# Patient Record
Sex: FEMALE | Race: BLACK OR AFRICAN AMERICAN
[De-identification: names, ages, dates, MRNs, and addresses within clinical notes are randomized per-mention and may not be internally consistent; named-entity substitution may affect disease eponyms.]

---

## 2018-06-15 ENCOUNTER — HOSPITAL ENCOUNTER (OUTPATIENT)
Dept: HOSPITAL 96 - M.RTH | Age: 63
End: 2018-06-15
Attending: RADIOLOGY
Payer: COMMERCIAL

## 2018-06-15 DIAGNOSIS — Z51.0: Primary | ICD-10-CM

## 2018-06-15 DIAGNOSIS — C02.9: ICD-10-CM

## 2018-06-24 NOTE — CON
[address]  [address]                    CONSULTATION                  
_______________________________________________________________________________
 
Name:       LUZ TORRES                  Room:                      REG CLI 
M.R.#:  [id number]      Account #:      [id number]  
Admission:  06/15/18     Attend Phys:    Collins Tapia MD    
Discharge:               Date of Birth:  [birth year]  
         Report #: [id number]
                                                                     [id number]  
_______________________________________________________________________________
THIS REPORT FOR:  //name//                      
 
CC: Collins Keith MD
 
DATE OF CONSULTATION:  06/15/2018
 
[facility] Phone: [phone]
 
 
RADIATION ONCOLOGY CONSULT NOTE
 
REFERRING PHYSICIANS:  Rivera Keith MD as well as Roberth Townsend III, DO
 
PRIMARY SITE AND HISTOPATHOLOGY:  The patient underwent resection of an oral
tongue cancer which ended up being a T3 N0 M0 oral tongue cancer with
perineural invasion.
 
HISTORY OF PRESENT ILLNESS:  The patient is a 62-year-old woman who developed a
right-sided tongue lesion that was noted in 2017.  The patient was
given several rounds of antibiotics and the lesion did not resolve.  The
patient then had a biopsy of this lesion on 2018 of the right lateral
tongue and the pathology revealed an invasive moderately differentiated
squamous cell carcinoma.  The patient also then went on to undergo a CT scan of
the neck that was done on 2018, which revealed a tongue mass that
measured about 4.6 cm.  On 2018, the patient underwent a right
hemiglossectomy, limited pharyngectomy, right modified radical neck dissection,
tracheostomy, left radial forearm free flap, pharyngoplasty, and complex
vestibuloplasty with the skin graft to arm and that was done by Dr. Keith. 
The pathology revealed a moderately differentiated squamous cell carcinoma. 
The greatest dimension was 3.67 cm.  The total dimensions were 3.6 cm. x 2.8
cm.  x 1.8 cm.  There was tumor extension into skeletal muscle.  The margins
were not involved by invasive cancer. The closest margin was about 1 mm from
the surgical margin.  Twenty four lymph nodes were all resected.  None of the
lymph nodes were involved with cancer.  The patient had perineural invasion. 
The patient had a PET/CT scan on 2018, which revealed a hypermetabolic
right tongue lesion consistent with a primary malignancy of the right tonsillar
pillar.  The patient was then discussed at the multidisciplinary head and neck
cancer conference and they recommended postoperative radiation therapy.  The
patient presents to be considered for radiation therapy.  She is taking her
nutrition through her gastric tube that was placed on 2018.
 
PAST MEDICAL HISTORY AND PAST SURGICAL HISTORY:  The patient has a history of
depression and migraine headaches.
 
 
 
 
[place] [address]                    CONSULTATION                  
_______________________________________________________________________________
 
Name:       LUZ TORRES                  Room:                      REG CLI 
M.R.#:  [id number]      Account #:      [id number]  
Admission:  06/15/18     Attend Phys:    Collins Tapia MD    
Discharge:               Date of Birth:  [birth year]  
         Report #: [id number]
                                                                     [id number]  
_______________________________________________________________________________
MEDICATIONS:  Include 1 mg/mL oxycodone solution 5-10 mL every 6 hours as needed
for pain control as well as Mucomyst, Fosamax, vitamin D, lorazepam as needed,
Pamelor, and Senokot.
 
ALLERGIES:  FISH CONTAINING PRODUCTS, SHELLFISH AND SULFA.
 
OBSTETRICS AND GYNECOLOGY:  She is  3, para 3, menarche at age 11,
menopause at 29 years of age.
 
FAMILY HISTORY:  Mother had hypertension.
 
SOCIAL HISTORY:  The patient is retired.  She is .  She has 3 daughters. 
Ethanol:  She does not drink alcohol containing beverages.  Cigarettes:  She
quit smoking on 05/15/2018.  She smoked 1 pack per day for about 40 years.
 
REVIEW OF SYSTEMS:
GENERAL:  She denied having any fevers or chills.
SKIN:  She denied any color changes.
LYMPH NODES:  She denied having any enlarged or painful glands in the neck.
ENDOCRINE:  She denied having any hot or cold intolerance.
HEMATOLOGY/IMMUNOLOGY:  The patient denied having any recent bleeding.
MUSCULOSKELETAL:  She denied having any painful swollen joints.
HEAD AND NECK:  She denied having any headaches or migraines.
RESPIRATORY:  She denied having any shortness of breath.
CARDIOVASCULAR:  She denied having any palpitations.
GASTROINTESTINAL:  She denied having nausea or vomiting.
NEUROLOGIC:  She denied having any focal weakness.
 
PHYSICAL EXAMINATION:  VITAL SIGNS:  Height 5 feet 8 inches, weight 110.2
pounds, blood pressure 137/74, pulse 107, respirations 20, oxygen saturation
100%.  LYMPH NODES:  She had no cervical or supraclavicular lymphadenopathy. 
EYES:  Pupils were equal, round, reactive to light and accommodation.  HEAD,
EARS, NOSE AND THROAT:  Mouth:  She has a resected portion of the tongue on the
right side that appears to be healing well and she has a tracheostomy in place.
She does have her lower teeth intact.  HEART:  Had a regular rate and rhythm
without murmur.  LUNGS: were clear to auscultation.  ABDOMEN:  Had a gastric
tube intact.  Liver was at the costal margin.  Spleen was not palpable. 
NEUROLOGIC:  Cranial nerves II to XIIwere intact.  Sensation was intact.  She
had 5/5 strength in her extremities.
 
LABORATORY DATA:  From 2018, hemoglobin 9.2, platelets 182,000.  Sodium
138, potassium 3.7, creatinine 0.55.
 
ASSESSMENT AND PLAN:  The patient has a resected squamous cell carcinoma of the
tongue with high risk features consisting of perineural invasion.  The patient
was offered postoperative radiation therapy.  It is high risk based on studies
 
 
 
[place] [address]                    CONSULTATION                  
_______________________________________________________________________________
 
Name:       ULZ TORRES                  Room:                      REG CLI 
M.R.#:  [id number]      Account #:      [id number]  
Admission:  06/15/18     Attend Phys:    Collins Tapia MD    
Discharge:               Date of Birth:  [birth year]  
         Report #: [id number]
                                                                     [id number]  
_______________________________________________________________________________
such as the one entitled "perineural invasions squamous cell carcinoma in the
oral cavity, histology, tumor stage, and outcome" which was published in
laryngoscope and the authors was Dr. Cotton.  After primary surgical treatment,
recurrence free survival rates were 47% if there was a perineural invasion
versus 80.4% for a matched control groups.  Because of the increased risk of
local recurrence with surgical recurrence alone, the patient was offered
radiation therapy.  The patient is a good candidate for intensity modulated
radiation therapy. The risks, benefits, logistics of radiation therapy were
explained to the patient in detail.  She gave her witnessed, informed consent
to proceed with radiation therapy.  She will be referred to her dentist to
evaluate her dentition prior to starting her radiation therapy.  She was
encouraged to keep up her nutritional intake.
 
Thank you very much for this consult.
 
 
 
 
 
 
 
 
 
 
 
 
 
 
 
 
 
 
 
 
 
 
 
 
 
 
 
 
 
 
<ELECTRONICALLY SIGNED>
                                        By:  Collins Tapia MD             
18     1220
D: 18 2349_______________________________________
T: 18 0314Dkenzie Tapia MD                /nt

## 2018-09-12 ENCOUNTER — HOSPITAL ENCOUNTER (OUTPATIENT)
Dept: HOSPITAL 96 - M.RTH | Age: 63
End: 2018-09-12
Attending: RADIOLOGY
Payer: COMMERCIAL

## 2018-09-12 DIAGNOSIS — Z08: Primary | ICD-10-CM

## 2018-09-12 DIAGNOSIS — Z79.899: ICD-10-CM

## 2018-09-12 DIAGNOSIS — Z85.810: ICD-10-CM

## 2018-09-12 DIAGNOSIS — B37.0: ICD-10-CM

## 2018-09-22 NOTE — ONC
[address]  [address]                    RADIATION ONCOLOGY NOTE       
_______________________________________________________________________________
 
Name:       LUZ TORRES                  Room:                      REG CLI 
M.R.#:  [id number]      Account #:      [id number]  
Admission:  09/12/18     Attend Phys:    Collins Tapia MD    
Discharge:               Date of Birth:  [birth year]  
         Report #: [id number]
                                                                     [id number]  
_______________________________________________________________________________
THIS REPORT FOR:  //name//                      
 
CC: Collins Keith MD
 
DATE OF SERVICE:  09/12/2018
 
 
REFERRING PHYSICIANS:  Rivera Keith MD as well as Dr. Roberth Townsend.
 
[facility] phone is [phone].
 
PRIMARY SITE AND HISTOPATHOLOGY:  The patient underwent resection of an oral
tongue cancer, which was a T3N0M0 oral tongue cancer and there was perineural
invasion.  The patient then received postoperative radiation therapy, and the
radiation therapy was completed on 08/31/2018.
 
INTERVAL NOTE:  She is still taking most of her nutrition through her gastric
tube.  She takes about 3 supplements through the gastric tube per day.  She is
still taking liquid hydrocodone about once a day.  She has been using viscous
lidocaine with Calendula cream for skin care.
 
MEDICATIONS:  Zofran as needed, Tylenol as needed, hydrocodone as needed,
MiraLax, lorazepam and viscous lidocaine with Calendula cream for skin care.
 
REVIEW OF SYSTEMS:
RESPIRATORY:  She was not short of breath.
GASTROINTESTINAL:  She does have some white spots on her tongue, and she is only
taking sips of water.  Otherwise, she is getting all her nutrition through the
gastric tube.  She was interested in possibly seeing if she still needed to have
the tracheostomy tube in.
 
PHYSICAL EXAMINATION:
VITAL SIGNS:  The patient weighed 116.6 pounds on 09/12/2018.  She was 110.2
pounds on 06/15/2018. On 09/12/2018, blood pressure was 131/79, pulse 102,
respirations 18, oxygen saturation was 95% on room air.
LYMPH NODES:  No cervical or supraclavicular lymphadenopathy.
SKIN:  Had mild hyperpigmentation as expected after radiation therapy.
HEAD, EYES, EARS, NOSE AND THROAT:  Mouth had some white discoloration on the
tongue consistent with  oral candidiasis. The tracheostomy tube was intact. 
Tongue had no suspicious visible lesions or suspicious palpable lesions.
HEART:  Had a regular rate and rhythm with no murmur.
LUNGS:  were clear to auscultation.
ABDOMEN:  Gastric tube was intact.
 
 
 
 
[place] [address]                    RADIATION ONCOLOGY NOTE       
_______________________________________________________________________________
 
Name:       LUZ TORRES                  Room:                      REG CLI 
M.R.#:  [id number]      Account #:      [id number]  
Admission:  09/12/18     Attend Phys:    Collins Tapia MD    
Discharge:               Date of Birth:  [birth year]  
         Report #: [id number]
                                                                     [id number]  
_______________________________________________________________________________
LABORATORY DATA:  From 09/07/2018, hemoglobin 12.7, platelets 336,000, white
blood cells 6.2.  Sodium 133, potassium 4.0, BUN 17, creatinine 0.72.
 
ASSESSMENT AND PLAN:
 
1.  History of tongue cancer- There is no evidence of tongue cancer at this
time.  The patient will be referred to her ear, nose and throat physician, Dr. Keith, for continued follow up.  A complete blood count, basic metabolic and
TSH level as well as a neck CT will be ordered in about a month and the patient
will be asked to schedule a follow up appointment with me afterwards.
 
2.  Nutrition-  The patient wants Dr. Keith to see if the tracheostomy tube
can be decreased in size and then she thinks she will be able to take more
nutrition by mouth.  Right now, she is taking sips of water by mouth, and she
is gaining weight with the  nutritional supplements she is taking through the
gastric tube.
 
3.  Oral candidiasis-  The patient was prescribed fluconazole for oral
candidiasis.
 
4.  Pain control- The patient feels like her pain is much better, though she
thinks she will probably continue to  need  hydrocodone in the near future. 
She was given a refill for liquid hydrocodone at this time.
 
5.  Skin care-  The patient applies Calendula cream with lidocaine for skin
care.
 
Thank you for allowing me to participate in the care of this patient.
 
 
 
 
 
 
 
 
 
 
 
 
 
 
 
 
<ELECTRONICALLY SIGNED>
                                        By:  Collins Tapia MD             
09/22/18     1446
D: 09/12/18 1226_______________________________________
T: 09/12/18 2021Dkenzie Tapia MD                /nt